# Patient Record
Sex: FEMALE | Race: WHITE | NOT HISPANIC OR LATINO
[De-identification: names, ages, dates, MRNs, and addresses within clinical notes are randomized per-mention and may not be internally consistent; named-entity substitution may affect disease eponyms.]

---

## 2019-04-24 PROBLEM — Z00.00 ENCOUNTER FOR PREVENTIVE HEALTH EXAMINATION: Status: ACTIVE | Noted: 2019-04-24

## 2019-05-23 ENCOUNTER — APPOINTMENT (OUTPATIENT)
Dept: ORTHOPEDIC SURGERY | Facility: CLINIC | Age: 59
End: 2019-05-23

## 2019-05-30 ENCOUNTER — APPOINTMENT (OUTPATIENT)
Dept: ORTHOPEDIC SURGERY | Facility: CLINIC | Age: 59
End: 2019-05-30
Payer: COMMERCIAL

## 2019-05-30 VITALS — HEART RATE: 76 BPM | DIASTOLIC BLOOD PRESSURE: 60 MMHG | SYSTOLIC BLOOD PRESSURE: 92 MMHG

## 2019-05-30 DIAGNOSIS — M54.5 LOW BACK PAIN: ICD-10-CM

## 2019-05-30 DIAGNOSIS — Z96.651 PRESENCE OF RIGHT ARTIFICIAL KNEE JOINT: ICD-10-CM

## 2019-05-30 PROCEDURE — 99204 OFFICE O/P NEW MOD 45 MIN: CPT

## 2019-05-30 PROCEDURE — 72170 X-RAY EXAM OF PELVIS: CPT

## 2019-05-30 PROCEDURE — 73562 X-RAY EXAM OF KNEE 3: CPT | Mod: 50

## 2019-05-30 PROCEDURE — 72100 X-RAY EXAM L-S SPINE 2/3 VWS: CPT

## 2019-07-02 ENCOUNTER — OUTPATIENT (OUTPATIENT)
Dept: OUTPATIENT SERVICES | Facility: HOSPITAL | Age: 59
LOS: 1 days | End: 2019-07-02
Payer: COMMERCIAL

## 2019-07-02 VITALS
WEIGHT: 214.95 LBS | OXYGEN SATURATION: 97 % | HEART RATE: 60 BPM | DIASTOLIC BLOOD PRESSURE: 80 MMHG | TEMPERATURE: 98 F | RESPIRATION RATE: 16 BRPM | SYSTOLIC BLOOD PRESSURE: 120 MMHG | HEIGHT: 66 IN

## 2019-07-02 DIAGNOSIS — Z90.710 ACQUIRED ABSENCE OF BOTH CERVIX AND UTERUS: Chronic | ICD-10-CM

## 2019-07-02 DIAGNOSIS — M17.12 UNILATERAL PRIMARY OSTEOARTHRITIS, LEFT KNEE: ICD-10-CM

## 2019-07-02 DIAGNOSIS — Z98.890 OTHER SPECIFIED POSTPROCEDURAL STATES: Chronic | ICD-10-CM

## 2019-07-02 DIAGNOSIS — Z96.651 PRESENCE OF RIGHT ARTIFICIAL KNEE JOINT: Chronic | ICD-10-CM

## 2019-07-02 DIAGNOSIS — E11.9 TYPE 2 DIABETES MELLITUS WITHOUT COMPLICATIONS: ICD-10-CM

## 2019-07-02 LAB
ANION GAP SERPL CALC-SCNC: 16 MMO/L — HIGH (ref 7–14)
APPEARANCE UR: CLEAR — SIGNIFICANT CHANGE UP
BILIRUB UR-MCNC: NEGATIVE — SIGNIFICANT CHANGE UP
BLD GP AB SCN SERPL QL: NEGATIVE — SIGNIFICANT CHANGE UP
BLOOD UR QL VISUAL: NEGATIVE — SIGNIFICANT CHANGE UP
BUN SERPL-MCNC: 10 MG/DL — SIGNIFICANT CHANGE UP (ref 7–23)
CALCIUM SERPL-MCNC: 10.1 MG/DL — SIGNIFICANT CHANGE UP (ref 8.4–10.5)
CHLORIDE SERPL-SCNC: 102 MMOL/L — SIGNIFICANT CHANGE UP (ref 98–107)
CO2 SERPL-SCNC: 24 MMOL/L — SIGNIFICANT CHANGE UP (ref 22–31)
COLOR SPEC: SIGNIFICANT CHANGE UP
CREAT SERPL-MCNC: 0.64 MG/DL — SIGNIFICANT CHANGE UP (ref 0.5–1.3)
GLUCOSE SERPL-MCNC: 85 MG/DL — SIGNIFICANT CHANGE UP (ref 70–99)
GLUCOSE UR-MCNC: NEGATIVE — SIGNIFICANT CHANGE UP
HBA1C BLD-MCNC: 7.1 % — HIGH (ref 4–5.6)
HCT VFR BLD CALC: 41.3 % — SIGNIFICANT CHANGE UP (ref 34.5–45)
HGB BLD-MCNC: 13 G/DL — SIGNIFICANT CHANGE UP (ref 11.5–15.5)
KETONES UR-MCNC: NEGATIVE — SIGNIFICANT CHANGE UP
LEUKOCYTE ESTERASE UR-ACNC: NEGATIVE — SIGNIFICANT CHANGE UP
MCHC RBC-ENTMCNC: 28.3 PG — SIGNIFICANT CHANGE UP (ref 27–34)
MCHC RBC-ENTMCNC: 31.5 % — LOW (ref 32–36)
MCV RBC AUTO: 90 FL — SIGNIFICANT CHANGE UP (ref 80–100)
NITRITE UR-MCNC: NEGATIVE — SIGNIFICANT CHANGE UP
NRBC # FLD: 0 K/UL — SIGNIFICANT CHANGE UP (ref 0–0)
PH UR: 7 — SIGNIFICANT CHANGE UP (ref 5–8)
PLATELET # BLD AUTO: 293 K/UL — SIGNIFICANT CHANGE UP (ref 150–400)
PMV BLD: 10.8 FL — SIGNIFICANT CHANGE UP (ref 7–13)
POTASSIUM SERPL-MCNC: 4.1 MMOL/L — SIGNIFICANT CHANGE UP (ref 3.5–5.3)
POTASSIUM SERPL-SCNC: 4.1 MMOL/L — SIGNIFICANT CHANGE UP (ref 3.5–5.3)
PROT UR-MCNC: NEGATIVE — SIGNIFICANT CHANGE UP
RBC # BLD: 4.59 M/UL — SIGNIFICANT CHANGE UP (ref 3.8–5.2)
RBC # FLD: 13.8 % — SIGNIFICANT CHANGE UP (ref 10.3–14.5)
RH IG SCN BLD-IMP: POSITIVE — SIGNIFICANT CHANGE UP
SODIUM SERPL-SCNC: 142 MMOL/L — SIGNIFICANT CHANGE UP (ref 135–145)
SP GR SPEC: 1.02 — SIGNIFICANT CHANGE UP (ref 1–1.04)
UROBILINOGEN FLD QL: NORMAL — SIGNIFICANT CHANGE UP
WBC # BLD: 4.79 K/UL — SIGNIFICANT CHANGE UP (ref 3.8–10.5)
WBC # FLD AUTO: 4.79 K/UL — SIGNIFICANT CHANGE UP (ref 3.8–10.5)

## 2019-07-02 PROCEDURE — 93010 ELECTROCARDIOGRAM REPORT: CPT

## 2019-07-02 NOTE — H&P PST ADULT - NSANTHOSAYNRD_GEN_A_CORE
No. TOM screening performed.  STOP BANG Legend: 0-2 = LOW Risk; 3-4 = INTERMEDIATE Risk; 5-8 = HIGH Risk

## 2019-07-02 NOTE — H&P PST ADULT - RS GEN PE MLT RESP DETAILS PC
no wheezes/respirations non-labored/airway patent/good air movement/breath sounds equal/no rales/no rhonchi

## 2019-07-02 NOTE — H&P PST ADULT - NEGATIVE ENMT SYMPTOMS
no sinus symptoms/no nose bleeds/no dysphagia/no hearing difficulty/no ear pain/no throat pain/no tinnitus/no vertigo

## 2019-07-02 NOTE — H&P PST ADULT - NSICDXPASTSURGICALHX_GEN_ALL_CORE_FT
PAST SURGICAL HISTORY:  H/O abdominal hysterectomy     S/P arthroscopy of knee 2016, Left, torn meniscus    S/P total knee replacement, right 2017

## 2019-07-02 NOTE — H&P PST ADULT - NEGATIVE CARDIOVASCULAR SYMPTOMS
no palpitations/no dyspnea on exertion/no paroxysmal nocturnal dyspnea/no chest pain/no peripheral edema

## 2019-07-02 NOTE — H&P PST ADULT - NSICDXPASTMEDICALHX_GEN_ALL_CORE_FT
PAST MEDICAL HISTORY:  Diabetes     Hypercholesterolemia     Torn meniscus left knee s/p surgery    Unilateral primary osteoarthritis, left knee

## 2019-07-02 NOTE — H&P PST ADULT - NSICDXFAMILYHX_GEN_ALL_CORE_FT
FAMILY HISTORY:  Father  Still living? No  Family history of colon cancer, Age at diagnosis: Age Unknown    Mother  Still living? Yes, Estimated age: Age Unknown  Asthma, Age at diagnosis: Age Unknown  Family history of thyroid problem, Age at diagnosis: Age Unknown

## 2019-07-02 NOTE — H&P PST ADULT - HISTORY OF PRESENT ILLNESS
58 year old female presents to presurgical testing with diagnosis of unilateral primary osteoarthritis, left knee scheduled for left total knee replacement for 7/17/19. Pt underwent total right knee replacement in 2017 doing well. Pt is complaining of increasing pain and dysfunction of left knee, underwent left knee arthroscopy in 2016, and symptoms are getting worse.

## 2019-07-02 NOTE — H&P PST ADULT - MUSCULOSKELETAL
details… detailed exam no joint swelling/no calf tenderness/decreased ROM due to pain/normal strength/no joint warmth/no joint erythema/left knee

## 2019-07-02 NOTE — H&P PST ADULT - NSICDXPROBLEM_GEN_ALL_CORE_FT
PROBLEM DIAGNOSES  Problem: Unilateral primary osteoarthritis, left knee  Assessment and Plan: Pt is scheduled for left total knee replacement for 7/17/19. Pre-op instructions provided. Pt given verbal and written instructions with teach back on chlorhexidine shampoo and pepcid. Pt verbalized understanding with return demonstration. Pt stated understanding.    Medical evaluation pending due to Hx of HLD and DM type 2. Pending copy of last echo and stress test.    Problem: Diabetes mellitus  Assessment and Plan: PMH Diabetes,  OR booking notified. Instructed to take last Jentadueto dose on 7/16 AM. PROBLEM DIAGNOSES  Problem: Unilateral primary osteoarthritis, left knee  Assessment and Plan: Pt is scheduled for left total knee replacement for 7/17/19. Pre-op instructions provided. Pt given verbal and written instructions with teach back on chlorhexidine shampoo and pepcid. Pt verbalized understanding with return demonstration. Pt stated understanding.    Medical evaluation pending due to Hx of HLD and DM type 2. Pending copy of last echo and stress test.    Problem: Diabetes mellitus  Assessment and Plan: PMH Diabetes,  OR booking notified. Instructed to take last Jentadueto dose on 7/16 AM.  Last dose of ASA on 7/9/19.    OR booking notified of allergy to contrast and Right knee artificial joint.

## 2019-07-02 NOTE — H&P PST ADULT - NEGATIVE OPHTHALMOLOGIC SYMPTOMS
no blurred vision L/no photophobia/no blurred vision R/no pain L/no pain R/no loss of vision L/no loss of vision R/no diplopia

## 2019-07-03 LAB — SPECIMEN SOURCE: SIGNIFICANT CHANGE UP

## 2019-07-04 LAB
BACTERIA UR CULT: SIGNIFICANT CHANGE UP
SPECIMEN SOURCE: SIGNIFICANT CHANGE UP

## 2019-07-05 LAB — BACTERIA NPH CULT: SIGNIFICANT CHANGE UP

## 2019-07-10 ENCOUNTER — OTHER (OUTPATIENT)
Age: 59
End: 2019-07-10

## 2019-07-10 RX ORDER — MUPIROCIN 20 MG/G
2 OINTMENT TOPICAL
Qty: 1 | Refills: 0 | Status: ACTIVE | COMMUNITY
Start: 2019-07-10 | End: 1900-01-01

## 2019-07-11 ENCOUNTER — OTHER (OUTPATIENT)
Age: 59
End: 2019-07-11

## 2019-07-11 DIAGNOSIS — M17.12 UNILATERAL PRIMARY OSTEOARTHRITIS, LEFT KNEE: ICD-10-CM

## 2019-07-15 PROBLEM — M17.12 UNILATERAL PRIMARY OSTEOARTHRITIS, LEFT KNEE: Chronic | Status: ACTIVE | Noted: 2019-07-02

## 2019-07-16 ENCOUNTER — TRANSCRIPTION ENCOUNTER (OUTPATIENT)
Age: 59
End: 2019-07-16

## 2019-07-16 RX ORDER — SODIUM CHLORIDE 9 MG/ML
1000 INJECTION, SOLUTION INTRAVENOUS
Refills: 0 | Status: DISCONTINUED | OUTPATIENT
Start: 2019-07-17 | End: 2019-07-18

## 2019-07-16 RX ORDER — SODIUM CHLORIDE 9 MG/ML
3 INJECTION INTRAMUSCULAR; INTRAVENOUS; SUBCUTANEOUS EVERY 8 HOURS
Refills: 0 | Status: DISCONTINUED | OUTPATIENT
Start: 2019-07-17 | End: 2019-08-01

## 2019-07-16 NOTE — ASU PATIENT PROFILE, ADULT - PSH
H/O abdominal hysterectomy    S/P arthroscopy of knee  2016, Left, torn meniscus  S/P total knee replacement, right  2017

## 2019-07-16 NOTE — ASU PATIENT PROFILE, ADULT - PMH
Diabetes    Hypercholesterolemia    Torn meniscus  left knee s/p surgery  Unilateral primary osteoarthritis, left knee

## 2019-07-17 ENCOUNTER — FORM ENCOUNTER (OUTPATIENT)
Age: 59
End: 2019-07-17

## 2019-07-17 ENCOUNTER — RESULT REVIEW (OUTPATIENT)
Age: 59
End: 2019-07-17

## 2019-07-17 ENCOUNTER — APPOINTMENT (OUTPATIENT)
Dept: ORTHOPEDIC SURGERY | Facility: HOSPITAL | Age: 59
End: 2019-07-17

## 2019-07-17 ENCOUNTER — OUTPATIENT (OUTPATIENT)
Dept: OUTPATIENT SERVICES | Facility: HOSPITAL | Age: 59
LOS: 1 days | Discharge: HOME | End: 2019-07-17
Payer: COMMERCIAL

## 2019-07-17 VITALS
HEIGHT: 66 IN | DIASTOLIC BLOOD PRESSURE: 73 MMHG | RESPIRATION RATE: 18 BRPM | SYSTOLIC BLOOD PRESSURE: 108 MMHG | TEMPERATURE: 98 F | OXYGEN SATURATION: 97 % | HEART RATE: 57 BPM | WEIGHT: 214.95 LBS

## 2019-07-17 DIAGNOSIS — Z96.651 PRESENCE OF RIGHT ARTIFICIAL KNEE JOINT: Chronic | ICD-10-CM

## 2019-07-17 DIAGNOSIS — Z98.890 OTHER SPECIFIED POSTPROCEDURAL STATES: Chronic | ICD-10-CM

## 2019-07-17 DIAGNOSIS — Z90.710 ACQUIRED ABSENCE OF BOTH CERVIX AND UTERUS: Chronic | ICD-10-CM

## 2019-07-17 DIAGNOSIS — M17.12 UNILATERAL PRIMARY OSTEOARTHRITIS, LEFT KNEE: ICD-10-CM

## 2019-07-17 LAB
ANION GAP SERPL CALC-SCNC: 14 MMO/L — SIGNIFICANT CHANGE UP (ref 7–14)
BUN SERPL-MCNC: 11 MG/DL — SIGNIFICANT CHANGE UP (ref 7–23)
CALCIUM SERPL-MCNC: 9.3 MG/DL — SIGNIFICANT CHANGE UP (ref 8.4–10.5)
CHLORIDE SERPL-SCNC: 105 MMOL/L — SIGNIFICANT CHANGE UP (ref 98–107)
CO2 SERPL-SCNC: 24 MMOL/L — SIGNIFICANT CHANGE UP (ref 22–31)
CREAT SERPL-MCNC: 0.73 MG/DL — SIGNIFICANT CHANGE UP (ref 0.5–1.3)
GLUCOSE BLDC GLUCOMTR-MCNC: 111 MG/DL — HIGH (ref 70–99)
GLUCOSE BLDC GLUCOMTR-MCNC: 179 MG/DL — HIGH (ref 70–99)
GLUCOSE SERPL-MCNC: 207 MG/DL — HIGH (ref 70–99)
HCT VFR BLD CALC: 38.1 % — SIGNIFICANT CHANGE UP (ref 34.5–45)
HGB BLD-MCNC: 11.9 G/DL — SIGNIFICANT CHANGE UP (ref 11.5–15.5)
MCHC RBC-ENTMCNC: 27.7 PG — SIGNIFICANT CHANGE UP (ref 27–34)
MCHC RBC-ENTMCNC: 31.2 % — LOW (ref 32–36)
MCV RBC AUTO: 88.8 FL — SIGNIFICANT CHANGE UP (ref 80–100)
NRBC # FLD: 0 K/UL — SIGNIFICANT CHANGE UP (ref 0–0)
PLATELET # BLD AUTO: 217 K/UL — SIGNIFICANT CHANGE UP (ref 150–400)
PMV BLD: 10 FL — SIGNIFICANT CHANGE UP (ref 7–13)
POTASSIUM SERPL-MCNC: 4.2 MMOL/L — SIGNIFICANT CHANGE UP (ref 3.5–5.3)
POTASSIUM SERPL-SCNC: 4.2 MMOL/L — SIGNIFICANT CHANGE UP (ref 3.5–5.3)
RBC # BLD: 4.29 M/UL — SIGNIFICANT CHANGE UP (ref 3.8–5.2)
RBC # FLD: 13.7 % — SIGNIFICANT CHANGE UP (ref 10.3–14.5)
RH IG SCN BLD-IMP: POSITIVE — SIGNIFICANT CHANGE UP
SODIUM SERPL-SCNC: 143 MMOL/L — SIGNIFICANT CHANGE UP (ref 135–145)
WBC # BLD: 5.61 K/UL — SIGNIFICANT CHANGE UP (ref 3.8–10.5)
WBC # FLD AUTO: 5.61 K/UL — SIGNIFICANT CHANGE UP (ref 3.8–10.5)

## 2019-07-17 PROCEDURE — 27447 TOTAL KNEE ARTHROPLASTY: CPT | Mod: LT

## 2019-07-17 PROCEDURE — 88305 TISSUE EXAM BY PATHOLOGIST: CPT | Mod: 26

## 2019-07-17 PROCEDURE — 88311 DECALCIFY TISSUE: CPT | Mod: 26

## 2019-07-17 RX ORDER — HYDROMORPHONE HYDROCHLORIDE 2 MG/ML
1 INJECTION INTRAMUSCULAR; INTRAVENOUS; SUBCUTANEOUS
Refills: 0 | Status: DISCONTINUED | OUTPATIENT
Start: 2019-07-17 | End: 2019-07-18

## 2019-07-17 RX ORDER — INSULIN LISPRO 100/ML
VIAL (ML) SUBCUTANEOUS
Refills: 0 | Status: DISCONTINUED | OUTPATIENT
Start: 2019-07-17 | End: 2019-08-01

## 2019-07-17 RX ORDER — DOCUSATE SODIUM 100 MG
100 CAPSULE ORAL THREE TIMES A DAY
Refills: 0 | Status: DISCONTINUED | OUTPATIENT
Start: 2019-07-17 | End: 2019-08-01

## 2019-07-17 RX ORDER — OXYCODONE HYDROCHLORIDE 5 MG/1
10 TABLET ORAL EVERY 4 HOURS
Refills: 0 | Status: DISCONTINUED | OUTPATIENT
Start: 2019-07-17 | End: 2019-07-17

## 2019-07-17 RX ORDER — SODIUM CHLORIDE 9 MG/ML
1000 INJECTION, SOLUTION INTRAVENOUS
Refills: 0 | Status: DISCONTINUED | OUTPATIENT
Start: 2019-07-17 | End: 2019-07-18

## 2019-07-17 RX ORDER — ATORVASTATIN CALCIUM 80 MG/1
10 TABLET, FILM COATED ORAL AT BEDTIME
Refills: 0 | Status: DISCONTINUED | OUTPATIENT
Start: 2019-07-17 | End: 2019-08-01

## 2019-07-17 RX ORDER — POLYETHYLENE GLYCOL 3350 17 G/17G
17 POWDER, FOR SOLUTION ORAL DAILY
Refills: 0 | Status: DISCONTINUED | OUTPATIENT
Start: 2019-07-17 | End: 2019-08-01

## 2019-07-17 RX ORDER — ATORVASTATIN CALCIUM 80 MG/1
1 TABLET, FILM COATED ORAL
Qty: 0 | Refills: 0 | DISCHARGE

## 2019-07-17 RX ORDER — PANTOPRAZOLE SODIUM 20 MG/1
40 TABLET, DELAYED RELEASE ORAL
Refills: 0 | Status: DISCONTINUED | OUTPATIENT
Start: 2019-07-17 | End: 2019-08-01

## 2019-07-17 RX ORDER — HYDROMORPHONE HYDROCHLORIDE 2 MG/ML
0.5 INJECTION INTRAMUSCULAR; INTRAVENOUS; SUBCUTANEOUS
Refills: 0 | Status: DISCONTINUED | OUTPATIENT
Start: 2019-07-17 | End: 2019-07-17

## 2019-07-17 RX ORDER — DEXTROSE 50 % IN WATER 50 %
12.5 SYRINGE (ML) INTRAVENOUS ONCE
Refills: 0 | Status: DISCONTINUED | OUTPATIENT
Start: 2019-07-17 | End: 2019-08-01

## 2019-07-17 RX ORDER — SODIUM CHLORIDE 9 MG/ML
1000 INJECTION INTRAMUSCULAR; INTRAVENOUS; SUBCUTANEOUS ONCE
Refills: 0 | Status: COMPLETED | OUTPATIENT
Start: 2019-07-17 | End: 2019-07-17

## 2019-07-17 RX ORDER — HYDROMORPHONE HYDROCHLORIDE 2 MG/ML
0.5 INJECTION INTRAMUSCULAR; INTRAVENOUS; SUBCUTANEOUS EVERY 4 HOURS
Refills: 0 | Status: DISCONTINUED | OUTPATIENT
Start: 2019-07-17 | End: 2019-07-17

## 2019-07-17 RX ORDER — LINAGLIPTIN AND METFORMIN HYDROCHLORIDE 2.5; 85 MG/1; MG/1
1 TABLET, FILM COATED ORAL
Qty: 0 | Refills: 0 | DISCHARGE

## 2019-07-17 RX ORDER — KETOROLAC TROMETHAMINE 30 MG/ML
15 SYRINGE (ML) INJECTION EVERY 6 HOURS
Refills: 0 | Status: DISCONTINUED | OUTPATIENT
Start: 2019-07-18 | End: 2019-07-18

## 2019-07-17 RX ORDER — SENNA PLUS 8.6 MG/1
2 TABLET ORAL EVERY 12 HOURS
Refills: 0 | Status: DISCONTINUED | OUTPATIENT
Start: 2019-07-17 | End: 2019-07-18

## 2019-07-17 RX ORDER — CELECOXIB 200 MG/1
200 CAPSULE ORAL ONCE
Refills: 0 | Status: DISCONTINUED | OUTPATIENT
Start: 2019-07-19 | End: 2019-08-01

## 2019-07-17 RX ORDER — CEFAZOLIN SODIUM 1 G
2000 VIAL (EA) INJECTION EVERY 8 HOURS
Refills: 0 | Status: COMPLETED | OUTPATIENT
Start: 2019-07-18 | End: 2019-07-18

## 2019-07-17 RX ORDER — HYDROMORPHONE HYDROCHLORIDE 2 MG/ML
0.5 INJECTION INTRAMUSCULAR; INTRAVENOUS; SUBCUTANEOUS
Refills: 0 | Status: DISCONTINUED | OUTPATIENT
Start: 2019-07-17 | End: 2019-07-18

## 2019-07-17 RX ORDER — HYDROMORPHONE HYDROCHLORIDE 2 MG/ML
1 INJECTION INTRAMUSCULAR; INTRAVENOUS; SUBCUTANEOUS
Refills: 0 | Status: DISCONTINUED | OUTPATIENT
Start: 2019-07-17 | End: 2019-07-17

## 2019-07-17 RX ORDER — GABAPENTIN 400 MG/1
100 CAPSULE ORAL EVERY 8 HOURS
Refills: 0 | Status: DISCONTINUED | OUTPATIENT
Start: 2019-07-17 | End: 2019-08-01

## 2019-07-17 RX ORDER — ACETAMINOPHEN 500 MG
975 TABLET ORAL ONCE
Refills: 0 | Status: COMPLETED | OUTPATIENT
Start: 2019-07-17 | End: 2019-07-17

## 2019-07-17 RX ORDER — SODIUM CHLORIDE 9 MG/ML
1000 INJECTION, SOLUTION INTRAVENOUS
Refills: 0 | Status: DISCONTINUED | OUTPATIENT
Start: 2019-07-17 | End: 2019-08-01

## 2019-07-17 RX ORDER — ASPIRIN/CALCIUM CARB/MAGNESIUM 324 MG
325 TABLET ORAL
Refills: 0 | Status: DISCONTINUED | OUTPATIENT
Start: 2019-07-17 | End: 2019-08-01

## 2019-07-17 RX ORDER — TRAMADOL HYDROCHLORIDE 50 MG/1
50 TABLET ORAL ONCE
Refills: 0 | Status: DISCONTINUED | OUTPATIENT
Start: 2019-07-17 | End: 2019-07-17

## 2019-07-17 RX ORDER — ONDANSETRON 8 MG/1
4 TABLET, FILM COATED ORAL EVERY 6 HOURS
Refills: 0 | Status: DISCONTINUED | OUTPATIENT
Start: 2019-07-17 | End: 2019-08-01

## 2019-07-17 RX ORDER — DEXTROSE 50 % IN WATER 50 %
25 SYRINGE (ML) INTRAVENOUS ONCE
Refills: 0 | Status: DISCONTINUED | OUTPATIENT
Start: 2019-07-17 | End: 2019-08-01

## 2019-07-17 RX ORDER — OXYCODONE HYDROCHLORIDE 5 MG/1
5 TABLET ORAL EVERY 4 HOURS
Refills: 0 | Status: DISCONTINUED | OUTPATIENT
Start: 2019-07-17 | End: 2019-07-17

## 2019-07-17 RX ORDER — TRAMADOL HYDROCHLORIDE 50 MG/1
50 TABLET ORAL EVERY 8 HOURS
Refills: 0 | Status: DISCONTINUED | OUTPATIENT
Start: 2019-07-17 | End: 2019-07-18

## 2019-07-17 RX ORDER — DEXTROSE 50 % IN WATER 50 %
15 SYRINGE (ML) INTRAVENOUS ONCE
Refills: 0 | Status: DISCONTINUED | OUTPATIENT
Start: 2019-07-17 | End: 2019-08-01

## 2019-07-17 RX ORDER — GLUCAGON INJECTION, SOLUTION 0.5 MG/.1ML
1 INJECTION, SOLUTION SUBCUTANEOUS ONCE
Refills: 0 | Status: DISCONTINUED | OUTPATIENT
Start: 2019-07-17 | End: 2019-08-01

## 2019-07-17 RX ORDER — INSULIN LISPRO 100/ML
VIAL (ML) SUBCUTANEOUS AT BEDTIME
Refills: 0 | Status: DISCONTINUED | OUTPATIENT
Start: 2019-07-18 | End: 2019-08-01

## 2019-07-17 RX ORDER — PANTOPRAZOLE SODIUM 20 MG/1
40 TABLET, DELAYED RELEASE ORAL ONCE
Refills: 0 | Status: COMPLETED | OUTPATIENT
Start: 2019-07-17 | End: 2019-07-17

## 2019-07-17 RX ORDER — ACETAMINOPHEN 500 MG
650 TABLET ORAL EVERY 6 HOURS
Refills: 0 | Status: DISCONTINUED | OUTPATIENT
Start: 2019-07-17 | End: 2019-07-18

## 2019-07-17 RX ORDER — ACETAMINOPHEN 500 MG
650 TABLET ORAL EVERY 6 HOURS
Refills: 0 | Status: DISCONTINUED | OUTPATIENT
Start: 2019-07-17 | End: 2019-08-01

## 2019-07-17 RX ORDER — ATORVASTATIN CALCIUM 80 MG/1
10 TABLET, FILM COATED ORAL DAILY
Refills: 0 | Status: DISCONTINUED | OUTPATIENT
Start: 2019-07-17 | End: 2019-07-17

## 2019-07-17 RX ADMIN — Medication 975 MILLIGRAM(S): at 12:33

## 2019-07-17 RX ADMIN — TRAMADOL HYDROCHLORIDE 50 MILLIGRAM(S): 50 TABLET ORAL at 12:34

## 2019-07-17 RX ADMIN — SODIUM CHLORIDE 30 MILLILITER(S): 9 INJECTION, SOLUTION INTRAVENOUS at 12:33

## 2019-07-17 RX ADMIN — TRAMADOL HYDROCHLORIDE 50 MILLIGRAM(S): 50 TABLET ORAL at 13:05

## 2019-07-17 RX ADMIN — TRAMADOL HYDROCHLORIDE 50 MILLIGRAM(S): 50 TABLET ORAL at 22:58

## 2019-07-17 RX ADMIN — SODIUM CHLORIDE 1000 MILLILITER(S): 9 INJECTION INTRAMUSCULAR; INTRAVENOUS; SUBCUTANEOUS at 22:36

## 2019-07-17 RX ADMIN — SODIUM CHLORIDE 3 MILLILITER(S): 9 INJECTION INTRAMUSCULAR; INTRAVENOUS; SUBCUTANEOUS at 22:57

## 2019-07-17 RX ADMIN — HYDROMORPHONE HYDROCHLORIDE 0.5 MILLIGRAM(S): 2 INJECTION INTRAMUSCULAR; INTRAVENOUS; SUBCUTANEOUS at 20:10

## 2019-07-17 RX ADMIN — SODIUM CHLORIDE 150 MILLILITER(S): 9 INJECTION, SOLUTION INTRAVENOUS at 19:31

## 2019-07-17 RX ADMIN — Medication 975 MILLIGRAM(S): at 12:37

## 2019-07-17 RX ADMIN — Medication 100 MILLIGRAM(S): at 22:58

## 2019-07-17 RX ADMIN — PANTOPRAZOLE SODIUM 40 MILLIGRAM(S): 20 TABLET, DELAYED RELEASE ORAL at 12:33

## 2019-07-17 NOTE — PROGRESS NOTE ADULT - SUBJECTIVE AND OBJECTIVE BOX
Ortho Post-Op    Patient was seen and examined at bedside. Denies CP/SOB/Dizziness/headaches/N/V. Pain is controlled.     Vital Signs Last 24 Hrs  T(C): 36.5 (17 Jul 2019 19:30), Max: 36.8 (17 Jul 2019 11:36)  T(F): 97.7 (17 Jul 2019 19:30), Max: 98.2 (17 Jul 2019 11:36)  HR: 65 (17 Jul 2019 21:15) (57 - 91)  BP: 110/67 (17 Jul 2019 21:15) (108/73 - 125/79)  BP(mean): 77 (17 Jul 2019 21:15) (74 - 93)  RR: 18 (17 Jul 2019 21:15) (12 - 20)  SpO2: 100% (17 Jul 2019 21:15) (91% - 100%)    GEN: NAD  LLE: Dressing C/D/I.  HV in place   Bilat LE : Motor intact + EHL/FHL/TA/GS. Sensation is grossly intact distal . Extremity warm. Compartments are soft. DP 1+    Labs:                          11.9   5.61  )-----------( 217      ( 17 Jul 2019 19:30 )             38.1       07-17    143  |  105  |  11  ----------------------------<  207<H>  4.2   |  24  |  x     Ca    9.3      17 Jul 2019 19:30        A/P: Patient is a 58y y/o Female s/p elective left total knee arthroplasty, POD # 0    -Pain control/analgesia  -Inc spirometry  -Foot pumps  -F/U AM Labs  -PT/OT/WBAT  -Antibiotic periop-ancef  -Anticoagulation-aspirin 325mg bid  -Continue to monitor. Notify ortho with any questions.

## 2019-07-17 NOTE — ASU PREOP CHECKLIST - 4.
OK that pt took aspirin on 7/16 81mg as per anesthesiologist MD Alves pt took aspirin 81mg on 7/16 , ok as per anesthesiologist MD Alves

## 2019-07-17 NOTE — ASU PREOP CHECKLIST - SELECT TESTS ORDERED
Urinalysis/CMP/nasal culture, urine culture/BMP POCT Blood Glucose/Type and Screen/Urinalysis/BMP/CMP/Type and Cross/nasal culture, urine culture

## 2019-07-18 ENCOUNTER — TRANSCRIPTION ENCOUNTER (OUTPATIENT)
Age: 59
End: 2019-07-18

## 2019-07-18 VITALS
OXYGEN SATURATION: 96 % | HEART RATE: 66 BPM | RESPIRATION RATE: 16 BRPM | DIASTOLIC BLOOD PRESSURE: 67 MMHG | SYSTOLIC BLOOD PRESSURE: 96 MMHG | TEMPERATURE: 98 F

## 2019-07-18 DIAGNOSIS — D62 ACUTE POSTHEMORRHAGIC ANEMIA: ICD-10-CM

## 2019-07-18 DIAGNOSIS — M17.12 UNILATERAL PRIMARY OSTEOARTHRITIS, LEFT KNEE: ICD-10-CM

## 2019-07-18 DIAGNOSIS — Z29.9 ENCOUNTER FOR PROPHYLACTIC MEASURES, UNSPECIFIED: ICD-10-CM

## 2019-07-18 DIAGNOSIS — E78.00 PURE HYPERCHOLESTEROLEMIA, UNSPECIFIED: ICD-10-CM

## 2019-07-18 DIAGNOSIS — E11.9 TYPE 2 DIABETES MELLITUS WITHOUT COMPLICATIONS: ICD-10-CM

## 2019-07-18 LAB
ANION GAP SERPL CALC-SCNC: 13 MMO/L — SIGNIFICANT CHANGE UP (ref 7–14)
BUN SERPL-MCNC: 10 MG/DL — SIGNIFICANT CHANGE UP (ref 7–23)
CALCIUM SERPL-MCNC: 9 MG/DL — SIGNIFICANT CHANGE UP (ref 8.4–10.5)
CHLORIDE SERPL-SCNC: 106 MMOL/L — SIGNIFICANT CHANGE UP (ref 98–107)
CO2 SERPL-SCNC: 20 MMOL/L — LOW (ref 22–31)
CREAT SERPL-MCNC: 0.58 MG/DL — SIGNIFICANT CHANGE UP (ref 0.5–1.3)
GLUCOSE BLDC GLUCOMTR-MCNC: 206 MG/DL — HIGH (ref 70–99)
GLUCOSE BLDC GLUCOMTR-MCNC: 293 MG/DL — HIGH (ref 70–99)
GLUCOSE SERPL-MCNC: 187 MG/DL — HIGH (ref 70–99)
HCT VFR BLD CALC: 33 % — LOW (ref 34.5–45)
HGB BLD-MCNC: 10.5 G/DL — LOW (ref 11.5–15.5)
MCHC RBC-ENTMCNC: 28.7 PG — SIGNIFICANT CHANGE UP (ref 27–34)
MCHC RBC-ENTMCNC: 31.8 % — LOW (ref 32–36)
MCV RBC AUTO: 90.2 FL — SIGNIFICANT CHANGE UP (ref 80–100)
NRBC # FLD: 0 K/UL — SIGNIFICANT CHANGE UP (ref 0–0)
PLATELET # BLD AUTO: 222 K/UL — SIGNIFICANT CHANGE UP (ref 150–400)
PMV BLD: 10.2 FL — SIGNIFICANT CHANGE UP (ref 7–13)
POTASSIUM SERPL-MCNC: 4.2 MMOL/L — SIGNIFICANT CHANGE UP (ref 3.5–5.3)
POTASSIUM SERPL-SCNC: 4.2 MMOL/L — SIGNIFICANT CHANGE UP (ref 3.5–5.3)
RBC # BLD: 3.66 M/UL — LOW (ref 3.8–5.2)
RBC # FLD: 13.5 % — SIGNIFICANT CHANGE UP (ref 10.3–14.5)
SODIUM SERPL-SCNC: 139 MMOL/L — SIGNIFICANT CHANGE UP (ref 135–145)
WBC # BLD: 9.44 K/UL — SIGNIFICANT CHANGE UP (ref 3.8–10.5)
WBC # FLD AUTO: 9.44 K/UL — SIGNIFICANT CHANGE UP (ref 3.8–10.5)

## 2019-07-18 PROCEDURE — 73560 X-RAY EXAM OF KNEE 1 OR 2: CPT | Mod: 26,LT

## 2019-07-18 PROCEDURE — 99238 HOSP IP/OBS DSCHRG MGMT 30/<: CPT

## 2019-07-18 PROCEDURE — 99205 OFFICE O/P NEW HI 60 MIN: CPT

## 2019-07-18 RX ORDER — TRAMADOL HYDROCHLORIDE 50 MG/1
25 TABLET ORAL EVERY 8 HOURS
Refills: 0 | Status: DISCONTINUED | OUTPATIENT
Start: 2019-07-18 | End: 2019-07-18

## 2019-07-18 RX ORDER — PANTOPRAZOLE SODIUM 20 MG/1
1 TABLET, DELAYED RELEASE ORAL
Qty: 45 | Refills: 0
Start: 2019-07-18 | End: 2019-08-31

## 2019-07-18 RX ORDER — GABAPENTIN 400 MG/1
1 CAPSULE ORAL
Qty: 90 | Refills: 0
Start: 2019-07-18 | End: 2019-08-16

## 2019-07-18 RX ORDER — ACETAMINOPHEN 500 MG
3 TABLET ORAL
Qty: 84 | Refills: 3
Start: 2019-07-18 | End: 2019-08-14

## 2019-07-18 RX ORDER — SENNA PLUS 8.6 MG/1
2 TABLET ORAL AT BEDTIME
Refills: 0 | Status: DISCONTINUED | OUTPATIENT
Start: 2019-07-18 | End: 2019-08-01

## 2019-07-18 RX ORDER — KETOROLAC TROMETHAMINE 30 MG/ML
15 SYRINGE (ML) INJECTION EVERY 6 HOURS
Refills: 0 | Status: COMPLETED | OUTPATIENT
Start: 2019-07-18 | End: 2019-07-19

## 2019-07-18 RX ORDER — ASPIRIN/CALCIUM CARB/MAGNESIUM 324 MG
1 TABLET ORAL
Qty: 0 | Refills: 0 | DISCHARGE

## 2019-07-18 RX ORDER — DOCUSATE SODIUM 100 MG
1 CAPSULE ORAL
Qty: 90 | Refills: 0
Start: 2019-07-18 | End: 2019-08-16

## 2019-07-18 RX ORDER — TRAMADOL HYDROCHLORIDE 50 MG/1
50 TABLET ORAL EVERY 8 HOURS
Refills: 0 | Status: DISCONTINUED | OUTPATIENT
Start: 2019-07-18 | End: 2019-07-18

## 2019-07-18 RX ORDER — SENNA PLUS 8.6 MG/1
2 TABLET ORAL
Qty: 60 | Refills: 0
Start: 2019-07-18 | End: 2019-08-16

## 2019-07-18 RX ORDER — LANOLIN ALCOHOL/MO/W.PET/CERES
3 CREAM (GRAM) TOPICAL AT BEDTIME
Refills: 0 | Status: DISCONTINUED | OUTPATIENT
Start: 2019-07-18 | End: 2019-08-01

## 2019-07-18 RX ORDER — TRAMADOL HYDROCHLORIDE 50 MG/1
1 TABLET ORAL
Qty: 21 | Refills: 0
Start: 2019-07-18 | End: 2019-07-24

## 2019-07-18 RX ORDER — DEXAMETHASONE 0.5 MG/5ML
10 ELIXIR ORAL ONCE
Refills: 0 | Status: COMPLETED | OUTPATIENT
Start: 2019-07-18 | End: 2019-07-18

## 2019-07-18 RX ORDER — OXYCODONE HYDROCHLORIDE 5 MG/1
2 TABLET ORAL
Qty: 84 | Refills: 0
Start: 2019-07-18 | End: 2019-07-24

## 2019-07-18 RX ORDER — ASPIRIN/CALCIUM CARB/MAGNESIUM 324 MG
1 TABLET ORAL
Qty: 84 | Refills: 0
Start: 2019-07-18 | End: 2019-08-28

## 2019-07-18 RX ADMIN — POLYETHYLENE GLYCOL 3350 17 GRAM(S): 17 POWDER, FOR SOLUTION ORAL at 12:00

## 2019-07-18 RX ADMIN — ATORVASTATIN CALCIUM 10 MILLIGRAM(S): 80 TABLET, FILM COATED ORAL at 05:11

## 2019-07-18 RX ADMIN — TRAMADOL HYDROCHLORIDE 25 MILLIGRAM(S): 50 TABLET ORAL at 13:57

## 2019-07-18 RX ADMIN — Medication 100 MILLIGRAM(S): at 13:06

## 2019-07-18 RX ADMIN — SODIUM CHLORIDE 150 MILLILITER(S): 9 INJECTION, SOLUTION INTRAVENOUS at 09:40

## 2019-07-18 RX ADMIN — Medication 4: at 07:45

## 2019-07-18 RX ADMIN — Medication 15 MILLIGRAM(S): at 11:59

## 2019-07-18 RX ADMIN — Medication 6: at 12:00

## 2019-07-18 RX ADMIN — SODIUM CHLORIDE 3 MILLILITER(S): 9 INJECTION INTRAMUSCULAR; INTRAVENOUS; SUBCUTANEOUS at 13:04

## 2019-07-18 RX ADMIN — PANTOPRAZOLE SODIUM 40 MILLIGRAM(S): 20 TABLET, DELAYED RELEASE ORAL at 07:45

## 2019-07-18 RX ADMIN — Medication 100 MILLIGRAM(S): at 10:44

## 2019-07-18 RX ADMIN — Medication 650 MILLIGRAM(S): at 12:00

## 2019-07-18 RX ADMIN — Medication 100 MILLIGRAM(S): at 05:11

## 2019-07-18 RX ADMIN — SODIUM CHLORIDE 3 MILLILITER(S): 9 INJECTION INTRAMUSCULAR; INTRAVENOUS; SUBCUTANEOUS at 05:10

## 2019-07-18 RX ADMIN — Medication 102 MILLIGRAM(S): at 09:41

## 2019-07-18 RX ADMIN — Medication 325 MILLIGRAM(S): at 05:11

## 2019-07-18 RX ADMIN — Medication 650 MILLIGRAM(S): at 05:11

## 2019-07-18 RX ADMIN — Medication 15 MILLIGRAM(S): at 05:11

## 2019-07-18 RX ADMIN — Medication 100 MILLIGRAM(S): at 01:29

## 2019-07-18 NOTE — PHYSICAL THERAPY INITIAL EVALUATION ADULT - PLANNED THERAPY INTERVENTIONS, PT EVAL
transfer training/bed mobility training/gait training/stairs training/ROM/balance training/strengthening

## 2019-07-18 NOTE — CONSULT NOTE ADULT - PROBLEM SELECTOR RECOMMENDATION 9
Pain well controlled; continue management and pain control per ortho recs with toradol, celebrex, dilaudid prn and oxycodone IR prn.

## 2019-07-18 NOTE — PROVIDER CONTACT NOTE (OTHER) - RECOMMENDATIONS
Notify PA. Holding tramadol PO as per order. Suggested to PA to possibly change dose to 25mg so pt can still receive pain medication.

## 2019-07-18 NOTE — PROVIDER CONTACT NOTE (OTHER) - ASSESSMENT
VSS except BP 91/56. Repeat 106/62. Pt denies dizziness and lightheadedness now. States it sometimes occurs when getting up. According to family & pt, baseline . No other distress noted.

## 2019-07-18 NOTE — PROGRESS NOTE ADULT - SUBJECTIVE AND OBJECTIVE BOX
S: Pt was examined at bedside and is resting comfortably. No acute events overnight. Denies CP/SOB. HV unsewn is in place with 0cc output.    O:  T(C): 36.8 (07-18-19 @ 05:08), Max: 36.8 (07-17-19 @ 11:36)  HR: 63 (07-18-19 @ 05:08) (57 - 91)  BP: 102/77 (07-18-19 @ 05:08) (102/77 - 125/79)  RR: 15 (07-18-19 @ 05:08) (12 - 20)  SpO2: 96% (07-18-19 @ 05:08) (91% - 100%)  Wt(kg): --                          10.5   9.44  )-----------( 222      ( 18 Jul 2019 05:40 )             33.0     07-17    143  |  105  |  11  ----------------------------<  207<H>  4.2   |  24  |  0.73    Ca    9.3      17 Jul 2019 19:30    PE:  General: AAOx3, Resting comfortably  Extremities:  LLE:  Dressing is c/d/i, in knee immobilizer, HV (unsewn) 0 cc output  Compartments s/c  +EHL/FHL/TA/GS  SILT SPN/DPn/TN/Bustos/Sa  +2 DP, PT

## 2019-07-18 NOTE — DISCHARGE NOTE NURSING/CASE MANAGEMENT/SOCIAL WORK - NSDCPNINST_GEN_ALL_CORE
You have a post op appointment with Dr. Calderon on July 31, 2019 @ 8:45am in the [address] [place] [address] office. If you are unable to keep this appointment, please call the office to reschedule. Call MD if you develop a fever, or if there is redness, swelling, drainage or pain not relieved by pain medication. No heavy lifting, bending, or straining to move your bowels. Take over the counter stool softeners as needed to prevent constipation which may be caused by pain medication. You have a post op appointment with Dr. Calderon on July 31, 2019 @ 8:45am in the [address] [place] [address] office. If you are unable to keep this appointment, please call the office to reschedule. Call MD if you develop a fever, or if there is redness, swelling, drainage or pain not relieved by pain medication. No heavy lifting, bending, or straining to move your bowels. Take over the counter stool softeners as needed to prevent constipation which may be caused by pain medication. Your A1C= 7.1. Continue to follow a consistent carbohydrate diet and take your medications for diabetes. Follow up with your endocrinologist/ primary doctor for diabetes management.

## 2019-07-18 NOTE — CONSULT NOTE ADULT - ASSESSMENT
58F h/o HLP, DM and unilateral primary osteoarthritis now s/p left knee scheduled for left total knee replacement POD#1.

## 2019-07-18 NOTE — CONSULT NOTE ADULT - PROBLEM SELECTOR RECOMMENDATION 2
BoT8k=2.1%. FS mildly well controlled. Continue with ISS for now and continue to monitor FS closely.  -hold home meds while in-house

## 2019-07-18 NOTE — DISCHARGE NOTE PROVIDER - CARE PROVIDER_API CALL
Gucci Calderon)  Orthopaedic Surgery  [address], [address]  [place] [address]  Phone: [phone]  Fax: [phone]  Follow Up Time:

## 2019-07-18 NOTE — PROGRESS NOTE ADULT - ASSESSMENT
A: 58 year old female POD 1 from L TKA.     -WBAT  -PT/OT  -Remove HV after PT  -Pain control  -DVT PPx - ASA BID  -Regular diet  -Dispo: Home today

## 2019-07-18 NOTE — DISCHARGE NOTE NURSING/CASE MANAGEMENT/SOCIAL WORK - NSDCDPATPORTLINK_GEN_ALL_CORE
You can access the [facility] Patient Portal, offered by [facility], by registering with the following website: [email or web address]

## 2019-07-18 NOTE — CONSULT NOTE ADULT - SUBJECTIVE AND OBJECTIVE BOX
CHIEF COMPLAINT: Patient is a 58y old  Female who presents with a chief complaint of elective Left total knee arthroplasty 7/17/2019 (18 Jul 2019 07:32)    HPI: 58F h/o HLP, DM presents to presurgical testing with diagnosis of unilateral primary osteoarthritis, left knee scheduled for left total knee replacement for 7/17/19. Pt underwent total right knee replacement in 2017 doing well. Pt is complaining of increasing pain and dysfunction of left knee, underwent left knee arthroscopy in 2016, and symptoms are getting worse.     Patient tolerated procedure well. Denies any F/C/N/V, CP or SOB.     Pain Symptoms if applicable:             	                         none	   mild         moderate         severe  	                            0	    1-3	     4-6	         7-10  Pain: 4  Location: left knee	  Modifying factors: pain with walking	  Associated symptoms: left leg pain	    Allergies: contrast media (iodine-based) (Anaphylaxis)    HOME MEDICATIONS: [x] Reviewed    MEDICATIONS  (STANDING):  acetaminophen   Tablet .. 650 milliGRAM(s) Oral every 6 hours  aspirin enteric coated 325 milliGRAM(s) Oral two times a day  atorvastatin 10 milliGRAM(s) Oral at bedtime  dextrose 5%. 1000 milliLiter(s) (50 mL/Hr) IV Continuous <Continuous>  dextrose 50% Injectable 12.5 Gram(s) IV Push once  dextrose 50% Injectable 25 Gram(s) IV Push once  dextrose 50% Injectable 25 Gram(s) IV Push once  docusate sodium 100 milliGRAM(s) Oral three times a day  gabapentin 100 milliGRAM(s) Oral every 8 hours  insulin lispro (HumaLOG) corrective regimen sliding scale   SubCutaneous three times a day before meals  insulin lispro (HumaLOG) corrective regimen sliding scale   SubCutaneous at bedtime  ketorolac   Injectable 15 milliGRAM(s) IV Push every 6 hours  pantoprazole    Tablet 40 milliGRAM(s) Oral before breakfast  polyethylene glycol 3350 17 Gram(s) Oral daily  senna 2 Tablet(s) Oral at bedtime  sodium chloride 0.9% lock flush 3 milliLiter(s) IV Push every 8 hours    MEDICATIONS  (PRN):  aluminum hydroxide/magnesium hydroxide/simethicone Suspension 30 milliLiter(s) Oral four times a day PRN Indigestion  dextrose 40% Gel 15 Gram(s) Oral once PRN Blood Glucose LESS THAN 70 milliGRAM(s)/deciliter  glucagon  Injectable 1 milliGRAM(s) IntraMuscular once PRN Glucose LESS THAN 70 milligrams/deciliter  HYDROmorphone  Injectable 0.5 milliGRAM(s) IV Push every 4 hours PRN breakthrough pain  melatonin 3 milliGRAM(s) Oral at bedtime PRN Insomnia  ondansetron Injectable 4 milliGRAM(s) IV Push every 6 hours PRN Nausea and/or Vomiting  oxyCODONE    IR 5 milliGRAM(s) Oral every 4 hours PRN Mild Pain And Moderate Pain  oxyCODONE    IR 10 milliGRAM(s) Oral every 4 hours PRN Severe Pain (7 - 10)    PAST MEDICAL & SURGICAL HISTORY:  Unilateral primary osteoarthritis, left knee  Torn meniscus: left knee s/p surgery  Hypercholesterolemia  Diabetes  S/P arthroscopy of knee: 2016, Left, torn meniscus  S/P total knee replacement, right: 2017  H/O abdominal hysterectomy  [x ] Reviewed     SOCIAL HISTORY:  · Marital Status		  · Occupation	Nurse	  · Lives With	spouse; mother, daughter	    Substance Use History:  · Substance Use	caffeine	  · Caffeine Type	coffee	  · Caffeine Amount/Frequency	1-2 cups/cans per day	    Alcohol Use History:  · Have you ever consumed alcohol	never	    Tobacco Usage:  · Tobacco Usage: Never smoker	    FAMILY HISTORY:  Family history of thyroid problem (Mother): hypothyroidism  Asthma (Mother)  Family history of colon cancer (Father)  [x] No pertinent family history in first degree relatives     REVIEW OF SYSTEMS:  [x] All other ROS negative      Vital Signs Last 24 Hrs  T(C): 37.2 (18 Jul 2019 12:53), Max: 37.2 (18 Jul 2019 12:53)  T(F): 99 (18 Jul 2019 12:53), Max: 99 (18 Jul 2019 12:53)  HR: 70 (18 Jul 2019 13:02) (58 - 91)  BP: 106/62 (18 Jul 2019 13:02) (91/62 - 125/79)  BP(mean): 77 (17 Jul 2019 21:30) (74 - 93)  RR: 15 (18 Jul 2019 12:53) (12 - 20)  SpO2: 95% (18 Jul 2019 12:53) (91% - 100%)    PHYSICAL EXAM:  GENERAL: NAD, well-groomed, well-developed  HEAD:  Atraumatic, Normocephalic  EYES: EOMI, PERRLA, conjunctiva and sclera clear  ENMT: Moist mucous membranes  NECK: Supple, No JVD  RESPIRATORY: Clear to auscultation bilaterally; No rales, rhonchi, wheezing, or rubs  CARDIOVASCULAR: Regular rate and rhythm; No murmurs, rubs, or gallops  GASTROINTESTINAL: Soft, Nontender, Nondistended; Bowel sounds present  GENITOURINARY: Not examined  EXTREMITIES:  2+ Peripheral Pulses, No clubbing, cyanosis, or edema  NERVOUS SYSTEM:  Alert & Oriented X3; Moving all 4 extremities; No gross sensory deficits  HEME/LYMPH: No lymphadenopathy noted  SKIN: No rashes or lesions; Dressing C/D/I    LABS:                        10.5   9.44  )-----------( 222      ( 18 Jul 2019 05:40 )             33.0     Hemoglobin: 10.5 g/dL (07-18 @ 05:40)  Hemoglobin: 11.9 g/dL (07-17 @ 19:30)    07-18    139  |  106  |  10  ----------------------------<  187<H>  4.2   |  20<L>  |  0.58    Ca    9.0      18 Jul 2019 05:40    RADIOLOGY & ADDITIONAL STUDIES:  Imaging:   Personally Reviewed:  [x] YES   < from: Xray Chest 1 View AP/PA (09.25.14 @ 19:39) >  IMPRESSION: 1. Poorly inspired film demonstrates no gross consolidation.    < end of copied text >              [ ] Consultant(s) Notes Reviewed  [x] Care Discussed with Consultants/Other Providers: Ortho PA Mary Ann - discussed disposition

## 2019-07-18 NOTE — DISCHARGE NOTE PROVIDER - NSDCCPTREATMENT_GEN_ALL_CORE_FT
PRINCIPAL PROCEDURE  Procedure: Left total knee replacement  Findings and Treatment: dictated operative note  pain control, pain med may cause drowsiness, refrain from activities require decision making, physical therapy to help resume activities of daily living  weight bearing as tolerated to Left leg  Office appointment is already made (see card attached to discharge folder) so if you need to reschedule please call Dr. Calderon's office [phone]

## 2019-07-18 NOTE — DISCHARGE NOTE PROVIDER - CARE PROVIDERS DIRECT ADDRESSES
[email or web address]

## 2019-07-18 NOTE — DISCHARGE NOTE PROVIDER - NSDCCPCAREPLAN_GEN_ALL_CORE_FT
PRINCIPAL DISCHARGE DIAGNOSIS  Diagnosis: Left knee DJD  Assessment and Plan of Treatment: 59 yo female with a history of OA, admitted to the orthopedic service for an elective left total knee replacement.  Pt medically optimized and underwent surgery on 7/17 by Dr Calderon.  Pt tolerated procedure well without any carlito-op complications.  Post op, pt made weight bear as tolerated and put on  mg twice a day for blood clot prevention for 6 weeks  Pt doing well in PT/OT, pain controlled, labs and vitals stable, incision clean and dry, as per Dr Calderon, pt stable for discharge.  Pt is to keep aquacel dressing on until POD #14, and then remove dressing and sutures.  Call Dr Borrero office to make an appt within 2 weeks at [phone] and PMD within 2 weeks for continuity of care as there may have been changes in patients medications during hospital stay.  Notify ortho with any questions or concerns. PRINCIPAL DISCHARGE DIAGNOSIS  Diagnosis: Left knee DJD  Assessment and Plan of Treatment: 57 yo female with a history of OA, admitted to the orthopedic service for an elective left total knee replacement.  Pt medically optimized and underwent surgery on 7/17 by Dr Calderon.  Pt tolerated procedure well without any carlito-dictated operative note complications.  Post dictated operative note, patient made weight bear as tolerated and put on  mg twice a day for blood clot prevention for 6 weeks  Pt doing well in physical therapy to help resume activities of daily living/OT, pain controlled, labs and vitals stable, incision clean and dry, as per Dr Calderon, patient stable for discharge.  Pt is to keep aquacel dressing on until POD #14, and then remove dressing and sutures.  Call Dr Borrero office to make an appointment within 2 weeks at [phone] and PMD within 2 weeks for continuity of care as there may have been changes in patients medications during hospital stay.  Notify ortho with any questions or concerns.  Istop reviewed Reference #: [id number]

## 2019-07-18 NOTE — DISCHARGE NOTE NURSING/CASE MANAGEMENT/SOCIAL WORK - NSDPDISTO_GEN_ALL_CORE
Home with home care/Pt. is afebrile and offers no complaints. In no acute distress. Left knee dressing: clean, dry and intact. Pt is ambulating with a walker, tolerating diet well, and voiding in adequate amounts.

## 2019-07-18 NOTE — PHYSICAL THERAPY INITIAL EVALUATION ADULT - ACTIVE RANGE OF MOTION EXAMINATION, REHAB EVAL
Right LE Active ROM was WFL (within functional limits)/bilateral upper extremity Active ROM was WFL (within functional limits)/left hip and knee fleixon 0-90, ankle wfl

## 2019-07-18 NOTE — DISCHARGE NOTE PROVIDER - HOSPITAL COURSE
57 yo female with a history of OA, admitted to the orthopedic service for an elective left total knee replacement.  Pt medically optimized and underwent surgery on 7/17 by Dr Calderon.  Pt tolerated procedure well without any carlito-op complications.  Post op, pt made weight bear as tolerated and put on  mg twice a day for blood clot prevention for 6 weeks  Pt doing well in PT/OT, pain controlled, labs and vitals stable, incision clean and dry, as per Dr Calderon, pt stable for discharge.  Pt is to keep aquacel dressing on until POD #14, and then remove dressing and sutures.  Call Dr Borrero office to make an appt within 2 weeks at [phone] and PMD within 2 weeks for continuity of care as there may have been changes in patients medications during hospital stay.  Notify ortho with any questions or concerns. 57 yo female with a history of OA, admitted to the orthopedic service for an elective left total knee replacement.  Pt medically optimized and underwent surgery on 7/17 by Dr Calderon.  Pt tolerated procedure well without any carlito-op complications.  Post op, pt made weight bear as tolerated and put on  mg twice a day for blood clot prevention for 6 weeks  Pt doing well in PT/OT, pain controlled, labs and vitals stable, incision clean and dry, as per Dr Calderon, pt stable for discharge.  Pt is to keep aquacel dressing on until POD #14, and then remove dressing and sutures.  Call Dr Borrero office to make an appt within 2 weeks at [phone] and PMD within 2 weeks for continuity of care as there may have been changes in patients medications during hospital stay.  Notify ortho with any questions or concerns.        Istop reviewed Reference #: [id number]

## 2019-07-25 LAB — SURGICAL PATHOLOGY STUDY: SIGNIFICANT CHANGE UP

## 2019-07-31 ENCOUNTER — APPOINTMENT (OUTPATIENT)
Dept: ORTHOPEDIC SURGERY | Facility: CLINIC | Age: 59
End: 2019-07-31
Payer: COMMERCIAL

## 2019-07-31 VITALS — SYSTOLIC BLOOD PRESSURE: 105 MMHG | HEART RATE: 88 BPM | DIASTOLIC BLOOD PRESSURE: 68 MMHG

## 2019-07-31 PROCEDURE — 99024 POSTOP FOLLOW-UP VISIT: CPT

## 2019-07-31 PROCEDURE — 73562 X-RAY EXAM OF KNEE 3: CPT | Mod: LT

## 2019-07-31 RX ORDER — OXYCODONE 5 MG/1
5 TABLET ORAL
Qty: 42 | Refills: 0 | Status: ACTIVE | COMMUNITY
Start: 2019-07-31 | End: 1900-01-01

## 2019-07-31 RX ORDER — GABAPENTIN 100 MG/1
100 CAPSULE ORAL 3 TIMES DAILY
Qty: 90 | Refills: 0 | Status: ACTIVE | COMMUNITY
Start: 2019-07-31 | End: 1900-01-01

## 2019-07-31 RX ORDER — TRAMADOL HYDROCHLORIDE 50 MG/1
50 TABLET, COATED ORAL
Qty: 90 | Refills: 0 | Status: ACTIVE | COMMUNITY
Start: 2019-07-31 | End: 1900-01-01

## 2019-07-31 NOTE — END OF VISIT
[FreeTextEntry3] : I have read the above documentation and agree with the treatment and plan. \par Gucci Calderon MD\par \par ______________________________________________\par [facility]: Hip/Knee Arthroplasty\par [address], [address], [place] [address]\par (t) [phone]\par (f) [phone]\par

## 2019-07-31 NOTE — PHYSICAL EXAM
[Antalgic] : antalgic [LE] : Sensory: Intact in bilateral lower extremities [Knee] : patellar 2+ and symmetric bilaterally [Ankle] : ankle 2+ and symmetric bilaterally [DP] : dorsalis pedis 2+ and symmetric bilaterally [PT] : posterior tibial 2+ and symmetric bilaterally [de-identified] : On general examination the patient is adequately groomed and nourished. The vital parameters are as recorded. \par There is no lymphedema or diffuse swelling, no varicose veins, no skin warmth/erythema/scars/swelling, no ulcers and no palpable lymph nodes or masses in both lower extremities. Bilateral pedal pulses are well palpable.\par Upper Extremity:\par Both right and left upper extremities are unremarkable in terms of skin rash, lesions, pigmentation, redness, tenderness, swelling, joint instability, abnormal deformity or crepitus. The overall range of motion, sensation, motor tone and strength testing are normal.\par \par Knee Exam\par The gait is left stiff knee antalgic.\par Knee alignment:            Right neutral with no flexion deformity. \par Left 3 degrees varus with mild flexion deformity.\par Right knee demonstrates well healed scar from TKR and the skin has no warmth, erythema, swelling or tenderness. \par Left knee demonstrates well healed scars from arthroscopy with no warmth erythema swelling or tenderness. \par Both knees have a range of motion of\par Extension:                    Right 0 degrees             Left -5 degrees\par Flexion:                                   Right 125 degrees          Left 110 degrees\par Left Knee: There is medial joint line tenderness. There is mild effusion. \par Luis Alfredo's test is positive. Jelly test is positive.\par Lachman's test, Anterior/Posterior Drawer test and Pivot Shift Tests are negative. \par There is left knee grade 1 MCL mediolateral laxity and no anteroposterior instability. \par Patella compression test is negative and patellofemoral tracking is normal with no lateral subluxation, apprehension or instability. \par Right knee quadriceps and hamstrings power is 5.\par Left knee quadriceps and hamstrings power is 4+.\par \par Hip Exam:\par The gait and station is normal\par The patient has equal leg lengths and no pelvic tilt. Chavez/Sunni test is 7 inches on the right and 7 inches on the left. Active SLR is 60 degrees on the right and 60 degrees on the left. Both hips demonstrate no scars and the skin has no signs of inflammation or tenderness. \par Both Hips have a normal range of motion of flexion to 100 degrees, abduction 40 degrees, adduction 20 degrees, external rotation 40 degrees, internal rotation 20 degrees with symmetrical motion in flexion and extension. There is no flexion contracture, deformity or instability. Labral impingement tests are negative.\par Both hips flexor, abductor and extensor power is normal.\par \par Spine Exam:\par There is mild curvature of the spine with loss of normal lumbar lordosis. The skin is devoid of erythema, scars, pigmentation or rashes. There is mild lumbar spasm and tenderness especially at L4-L5 or L5-S1. \par The range of motion of the lumbar spine is limited by pain and spasm. Forward flexion is 80% normal, extension catch positive, lateral flexion and rotation 80% normal. There is no lumbar spine imbalance or instability detected.\par Bilateral passive SLR is right 40 degrees, left 40 degrees in supine and sitting positions. Lasegue's Test is negative.\par Neurology:\par The patient is alert and oriented in person, place and time. The mood is calm and affect is normal.\par Testing for coordination including Rhomberg's Test and Finger-Nose Test, sensation, motor tone and power and deep tendon reflexes in both lower extremities is normal. [de-identified] : The following radiographs were ordered and read by me during this patients visit. I reviewed each radiograph in detail with the patient and discussed the findings as highlighted below. \par AP, lateral and skyline views of the bilateral knees confirm left knee advanced degenerative joint disease with medial joint space narrowing and osteophyte formation.Right knee reveals a well fixed and aligned right total knee replacement with no evidence of mechanical failure or periprosthetic fracture. \par AP view of the pelvis are within normal limits \par AP and lateral views of the lumbar spine reveal DJD with loss of normal lordosis

## 2019-07-31 NOTE — HISTORY OF PRESENT ILLNESS
[Healed] : healed [Clean/Dry/Intact] : clean, dry and intact [Neuro Intact] : an unremarkable neurological exam [Vascular Intact] : ~T peripheral vascular exam normal [Negative Julio's] : maneuvers demonstrated a negative Julio's sign [Doing Well] : is doing well [No Sign of Infection] : is showing no signs of infection [Steri-Strips Removed & Replaced] : steri-strips removed and replaced [Adequate Pain Control] : has adequate pain control [Chills] : no chills [Fever] : no fever [Erythema] : not erythematous [Discharge] : absent of discharge [Swelling] : not swollen [Dehiscence] : not dehisced [de-identified] : s/p left total knee replacement 7/17/2019 [de-identified] : Ms. JACE RUIZ  presents s/p left total knee replacement. She is participating in home physical therapy as well as a home exercise program daily. She is taking tramadol, gabapentin, and oxycodone for pain and pain level is controlled. she is taking aspirin for DVT ppx. Patient went to her PCP for urinary symptoms, and was put on Ciprofloxacin. \par  [de-identified] : LEFT Knee: Walks with a left stiff knee gait. The dressing was removed, incision cleaned, and steri strips applied to the incision. There is a well-healed scar surgery with no significant swelling, redness or tenderness. There is a valgus alignment of 5°, no effusion, active straight leg raise of 60° and knee range of motion of 0-95° with good stability alignment and quadriceps grade 4 power. There is some ecchymosis to the distal lower extremity. \par  [de-identified] : AP, lateral and sunrise views of the left knee taken today reveal a well fixed and aligned left total knee replacement with no evidence of mechanical failure or periprosthetic fracture.\par \par  [de-identified] : The patient was informed of the findings and recommended conservative management in the form of a home exercise program, activity modifications, stationary bicycling, and ambulation with a cane.  A prescription for a course of physical therapy was provided.  Prescriptions for tramadol, oxycodone, and gabapentin have been provided for pain control.  A long discussion was had with the patient advising them to wean from use of the narcotic medication as tolerated. She will take aspirin for DVT prophylaxis for another four weeks.  The risks, benefits, and side effects were discussed.  Follow up appointment was recommended in six weeks with [facility]. \par

## 2019-07-31 NOTE — CONSULT LETTER
[Dear  ___] : Dear  [unfilled], [Please see my note below.] : Please see my note below. [I had the pleasure of evaluating your patient, [unfilled].] : I had the pleasure of evaluating your patient, [unfilled]. [Sincerely,] : Sincerely, [FreeTextEntry2] : BENNETT LEWIS  [FreeTextEntry3] : Gucci Calderon MD\par \par ________________________________________________\par [facility] : Hip/Knee Arthroplasty\par [address], [place] [address]\par (T) [phone]\par (F) [phone]

## 2019-07-31 NOTE — DISCUSSION/SUMMARY
[de-identified] : Stable Right TKR, Left Knee advanced degenerative joint disease status post arthroscopy, Lumbar DJD\par The natural history and treatment of degenerative arthritis was discussed with the patient at length today. The spectrum of treatment including nonoperative modalities to surgical intervention was elucidated. Noninvasive and nonoperative treatment modalities include weight reduction, activity modification with low impact exercise,  as needed use of acetaminophen or anti-inflammatory medications if tolerated, glucosamine/chondroitin supplements, and physical therapy. Further treatments can include corticosteroid injection and the use of viscosupplementation with hyaluronic acid injections. Definitive surgical treatment can certainly include total joint arthroplasty also. The risks and benefits of each treatment options was discussed and all questions were answered.\par I have provided the patient with a referral to Dr. Aldana for evaluation of the lumbar spine.\par In view of lack of adequate pain relief with conservative (non-surgical) management protocol including physical therapy, home exercises, weight loss, activity modification, NSAIDS; the patient is recommended to consider a Left Total Knee Replacement. \par The risks, benefits, alternatives, implications, complications including but not limited to pain, stiffness, bleeding, limp, wound breakdown, infection, bone fracture, nerve and vascular compromise, implant wear, fixation options depending on bone quality, instability, and durability issues and rehabilitation were discussed and relevant questions were addressed. The possibility of recurrent pain, no improvement in pain and actual worsening of the pain were also mentioned in conversation with the patient. Medical complications related to the patient's general medical health including deep vein thrombosis, pulmonary embolus, heart attack, stroke, death and other complications from anesthesia were discussed as well. The patient wishes to proceed and will undergo preoperative medical evaluation and clearance, attend the preoperative educational class and will schedule surgery appropriately.\par Anticoagulation prophylaxis medication options to address risks of deep vein thrombosis and pulmonary embolism were discussed and weighed against the risks of bleeding and wound healing complications. The patient elected aspirin/coumadin prophylaxis with mechanical modalities.

## 2019-07-31 NOTE — CONSULT LETTER
[Dear  ___] : Dear  [unfilled], [Consult Letter:] : I had the pleasure of evaluating your patient, [unfilled]. [Please see my note below.] : Please see my note below. [Consult Closing:] : Thank you very much for allowing me to participate in the care of this patient.  If you have any questions, please do not hesitate to contact me. [Sincerely,] : Sincerely, [FreeTextEntry2] : BENNETT LEWIS\par  [FreeTextEntry3] : Gucci Calderon MD\par \par ______________________________________________\par [facility]: Hip/Knee Arthroplasty\par [address], [address], [place] [address]\par (t) [phone]\par (f) [phone]

## 2019-07-31 NOTE — HISTORY OF PRESENT ILLNESS
[8] : an average pain level of 8/10 [de-identified] : Ms. JACE RUIZ is a 58 year old female, Status post right TKR 8/31/2017 uncomplicated, status post left knee arthroscopy in 2015 following a fall on the left knee, presenting with left knee pain, now worsening. She localizes the pain to the medial and anterior aspect of the Left knee. The patient describes the pain as sharp, and states it is intermittent, based on activity. She states the pain is exacerbated by walking long distance, climbing/descending stairs, and rising from a seated position. She has been taking NSAIDs for pain with only mild temporary relief. She admits to prior conservative management inclusive of physical therapy with only mild improvement in condition. No injections to the left knee. She denies hip pain but admits to lower back pain. The patient admits to limitations in there quality of life, and is present to discuss options for treatment. JTM\par She denies any pertinent familial, social, or past medical history. NKDA.

## 2019-08-02 ENCOUNTER — APPOINTMENT (OUTPATIENT)
Dept: ORTHOPEDIC SURGERY | Facility: CLINIC | Age: 59
End: 2019-08-02
Payer: COMMERCIAL

## 2019-08-02 VITALS
SYSTOLIC BLOOD PRESSURE: 100 MMHG | WEIGHT: 200 LBS | HEIGHT: 64 IN | DIASTOLIC BLOOD PRESSURE: 69 MMHG | HEART RATE: 73 BPM | BODY MASS INDEX: 34.15 KG/M2

## 2019-08-02 PROCEDURE — 72100 X-RAY EXAM L-S SPINE 2/3 VWS: CPT

## 2019-08-02 PROCEDURE — 99214 OFFICE O/P EST MOD 30 MIN: CPT

## 2019-08-05 ENCOUNTER — OTHER (OUTPATIENT)
Age: 59
End: 2019-08-05

## 2019-08-16 ENCOUNTER — APPOINTMENT (OUTPATIENT)
Dept: ORTHOPEDIC SURGERY | Facility: CLINIC | Age: 59
End: 2019-08-16
Payer: COMMERCIAL

## 2019-08-16 VITALS
HEART RATE: 75 BPM | DIASTOLIC BLOOD PRESSURE: 70 MMHG | HEIGHT: 64 IN | BODY MASS INDEX: 34.15 KG/M2 | SYSTOLIC BLOOD PRESSURE: 104 MMHG | WEIGHT: 200 LBS

## 2019-08-16 DIAGNOSIS — M47.816 SPONDYLOSIS W/OUT MYELOPATHY OR RADICULOPATHY, LUMBAR REGION: ICD-10-CM

## 2019-08-16 PROCEDURE — 99214 OFFICE O/P EST MOD 30 MIN: CPT

## 2019-08-21 RX ORDER — TRAMADOL HYDROCHLORIDE 50 MG/1
50 TABLET, COATED ORAL
Qty: 21 | Refills: 1 | Status: ACTIVE | COMMUNITY
Start: 2019-08-21 | End: 1900-01-01

## 2019-08-22 ENCOUNTER — RX RENEWAL (OUTPATIENT)
Age: 59
End: 2019-08-22

## 2019-10-10 ENCOUNTER — APPOINTMENT (OUTPATIENT)
Dept: ORTHOPEDIC SURGERY | Facility: CLINIC | Age: 59
End: 2019-10-10
Payer: COMMERCIAL

## 2019-10-10 VITALS
SYSTOLIC BLOOD PRESSURE: 95 MMHG | DIASTOLIC BLOOD PRESSURE: 63 MMHG | BODY MASS INDEX: 33.97 KG/M2 | HEART RATE: 80 BPM | HEIGHT: 64 IN | WEIGHT: 199 LBS

## 2019-10-10 PROCEDURE — 99024 POSTOP FOLLOW-UP VISIT: CPT | Mod: 24

## 2019-10-10 PROCEDURE — 99213 OFFICE O/P EST LOW 20 MIN: CPT | Mod: 24

## 2019-10-10 RX ORDER — GABAPENTIN 100 MG/1
100 CAPSULE ORAL 3 TIMES DAILY
Qty: 90 | Refills: 0 | Status: ACTIVE | COMMUNITY
Start: 2019-10-10 | End: 1900-01-01

## 2019-11-01 NOTE — DISCUSSION/SUMMARY
[de-identified] : s/p left total knee replacement. \par The patient was informed of the findings and recommended conservative management in the form of a home exercise program, activity modifications, stationary bicycling, and ambulation with a heel toe gait.   She will continue with home exercise, and routine strength and conditioning, to maintain ROM and strength.  The risks, benefits, and side effects were discussed.  Follow up appointment was recommended in three months. \par She will continue on Eliquis as per her PCP for treatment of DVT.

## 2019-11-01 NOTE — HISTORY OF PRESENT ILLNESS
[de-identified] : Ms. JACE RUIZ is a 58 year old female presents s/p left total knee replacement 7/17/ 2019, presents for evaluation. She notes she has been doing physcial therapy and home exercise. She notes while at physical therapy on 9/9/19, she was evaluated as she had excessive swelling LLE along with positive homans test. She was diagnosed with DVT ultimately following ultrasound confirmation. She is now on Eliquis therapy, under the care of her PCP. \par She notes a set back due to this, but is making progress now with physical therapy and exercise. \par  [Stable] : stable [2] : a current pain level of 2/10 [Standing] : standing [Intermit.] : ~He/She~ states the symptoms seem to be intermittent [Walking] : worsened by walking [Knee Flexion] : worsened with knee flexion [Knee Extension] : worsened with knee extension [Rest] : relieved by rest

## 2019-11-01 NOTE — PHYSICAL EXAM
[de-identified] : On general examination the patient is adequately groomed and nourished. The vital parameters are as recorded. \par There is no lymphedema or diffuse swelling, no varicose veins, no skin warmth/erythema/scars/swelling, no ulcers and no palpable lymph nodes or masses in both lower extremities. Bilateral pedal pulses are well palpable.\par Upper Extremity:\par Both right and left upper extremities are unremarkable in terms of skin rash, lesions, pigmentation, redness, tenderness, swelling, joint instability, abnormal deformity or crepitus. The overall range of motion, sensation, motor tone and strength testing are normal.\par \par LEFT Knee: Walks with a left stiff knee gait. There is a well-healed scar surgery with no significant swelling, redness or tenderness. There is a valgus alignment of 5°, no effusion, active straight leg raise of 60° and knee range of motion of 0-115° with good stability alignment and quadriceps grade 4 power.\par  [de-identified] : AP, lateral, and skyline views of the left knee taken last visit reveal a well fixed and aligned left total knee replacement with no evidence of mechanical failure or periprosthetic fracture.\par

## 2019-11-01 NOTE — CONSULT LETTER
[Dear  ___] : Dear  [unfilled], [Consult Letter:] : I had the pleasure of evaluating your patient, [unfilled]. [Please see my note below.] : Please see my note below. [Consult Closing:] : Thank you very much for allowing me to participate in the care of this patient.  If you have any questions, please do not hesitate to contact me. [Sincerely,] : Sincerely, [FreeTextEntry2] : BENNETT LEWIS\par  [FreeTextEntry3] : Gucci Calderon MD\par \par ______________________________________________\par [facility]: Hip/Knee Arthroplasty\par [address], [address], [place] [address]\par (t) [phone]\par (f) [phone]\par

## 2019-11-18 ENCOUNTER — FORM ENCOUNTER (OUTPATIENT)
Age: 59
End: 2019-11-18

## 2020-03-19 ENCOUNTER — APPOINTMENT (OUTPATIENT)
Dept: ORTHOPEDIC SURGERY | Facility: CLINIC | Age: 60
End: 2020-03-19

## 2020-06-28 ENCOUNTER — EMERGENCY (EMERGENCY)
Facility: HOSPITAL | Age: 60
LOS: 1 days | Discharge: HOME | End: 2020-06-28
Attending: STUDENT IN AN ORGANIZED HEALTH CARE EDUCATION/TRAINING PROGRAM
Payer: COMMERCIAL

## 2020-06-28 VITALS
HEART RATE: 57 BPM | TEMPERATURE: 98 F | SYSTOLIC BLOOD PRESSURE: 109 MMHG | OXYGEN SATURATION: 97 % | WEIGHT: 177.03 LBS | RESPIRATION RATE: 18 BRPM | DIASTOLIC BLOOD PRESSURE: 70 MMHG | HEIGHT: 65 IN

## 2020-06-28 VITALS
OXYGEN SATURATION: 98 % | DIASTOLIC BLOOD PRESSURE: 72 MMHG | HEART RATE: 62 BPM | SYSTOLIC BLOOD PRESSURE: 110 MMHG | RESPIRATION RATE: 17 BRPM | TEMPERATURE: 98 F

## 2020-06-28 DIAGNOSIS — Z96.651 PRESENCE OF RIGHT ARTIFICIAL KNEE JOINT: Chronic | ICD-10-CM

## 2020-06-28 DIAGNOSIS — Z90.710 ACQUIRED ABSENCE OF BOTH CERVIX AND UTERUS: Chronic | ICD-10-CM

## 2020-06-28 DIAGNOSIS — Z98.890 OTHER SPECIFIED POSTPROCEDURAL STATES: Chronic | ICD-10-CM

## 2020-06-28 PROCEDURE — 99283 EMERGENCY DEPT VISIT LOW MDM: CPT | Mod: 25

## 2020-06-28 PROCEDURE — 90715 TDAP VACCINE 7 YRS/> IM: CPT

## 2020-06-28 PROCEDURE — 12002 RPR S/N/AX/GEN/TRNK2.6-7.5CM: CPT

## 2020-06-28 PROCEDURE — 90471 IMMUNIZATION ADMIN: CPT

## 2020-06-28 RX ORDER — TETANUS TOXOID, REDUCED DIPHTHERIA TOXOID AND ACELLULAR PERTUSSIS VACCINE, ADSORBED 5; 2.5; 8; 8; 2.5 [IU]/.5ML; [IU]/.5ML; UG/.5ML; UG/.5ML; UG/.5ML
0.5 SUSPENSION INTRAMUSCULAR ONCE
Refills: 0 | Status: COMPLETED | OUTPATIENT
Start: 2020-06-28 | End: 2020-06-28

## 2020-06-28 RX ADMIN — TETANUS TOXOID, REDUCED DIPHTHERIA TOXOID AND ACELLULAR PERTUSSIS VACCINE, ADSORBED 0.5 MILLILITER(S): 5; 2.5; 8; 8; 2.5 SUSPENSION INTRAMUSCULAR at 17:54

## 2020-06-28 NOTE — ED ADULT NURSE NOTE - OBJECTIVE STATEMENT
Patient present with laceration to right lower leg. Patient was getting out of car accidently hitting garbage bag unknown there was sharp object in bag causing laceration. Patient is a nurse clean wound and wrapped it. Hx DVT on Eliquis Patient present with laceration to right lower leg. Patient was getting out of car accidently hitting garbage bag unknown there was sharp object in bag causing laceration. Patient is a nurse clean wound and wrapped it. Hx DVT on Eliquis. No acute distress noted, denies chest pain, no shortness of breath indicated. Safety maintained.

## 2020-06-28 NOTE — ED PROVIDER NOTE - PRINCIPAL DIAGNOSIS
Laceration of leg excluding thigh, left, initial encounter Laceration of leg excluding thigh, right, initial encounter

## 2020-06-28 NOTE — ED PROVIDER NOTE - CARE PLAN
Principal Discharge DX:	Laceration of leg excluding thigh, left, initial encounter Principal Discharge DX:	Laceration of leg excluding thigh, right, initial encounter

## 2020-06-28 NOTE — ED ADULT TRIAGE NOTE - CHIEF COMPLAINT QUOTE
Patient present with laceration to right lower leg. Patient was getting out of car accidently hitting garbage bag unknown there was sharp object in bag causing laceration. Patient is a nurse clean wound and wrapped it. Hx DVT on Eliquis

## 2020-06-28 NOTE — ED PROVIDER NOTE - PATIENT PORTAL LINK FT
You can access the FollowMyHealth Patient Portal offered by [facility] by registering at the following website: [email or web address]. By joining [email or web address]’s FollowMyHealth portal, you will also be able to view your health information using other applications (apps) compatible with our system.

## 2020-07-22 NOTE — ASU PATIENT PROFILE, ADULT - VISION (WITH CORRECTIVE LENSES IF THE PATIENT USUALLY WEARS THEM):
Normal vision: sees adequately in most situations; can see medication labels, newsprint
-Patient cleared for discharge home  -PTL precautions reviewed  -Kick counts reviewed  -Patient instructed to follow up with next scheduled appointment  -Verbal and written discharge instructions given

## 2020-09-29 ENCOUNTER — APPOINTMENT (OUTPATIENT)
Dept: ORTHOPEDIC SURGERY | Facility: CLINIC | Age: 60
End: 2020-09-29
Payer: COMMERCIAL

## 2020-09-29 VITALS
OXYGEN SATURATION: 96 % | DIASTOLIC BLOOD PRESSURE: 70 MMHG | HEART RATE: 66 BPM | WEIGHT: 186 LBS | HEIGHT: 64 IN | BODY MASS INDEX: 31.76 KG/M2 | SYSTOLIC BLOOD PRESSURE: 101 MMHG

## 2020-09-29 DIAGNOSIS — Z96.652 PRESENCE OF LEFT ARTIFICIAL KNEE JOINT: ICD-10-CM

## 2020-09-29 PROCEDURE — 73562 X-RAY EXAM OF KNEE 3: CPT | Mod: LT

## 2020-09-29 PROCEDURE — 99213 OFFICE O/P EST LOW 20 MIN: CPT

## 2020-09-29 RX ORDER — MELOXICAM 15 MG/1
15 TABLET ORAL DAILY
Qty: 30 | Refills: 1 | Status: ACTIVE | COMMUNITY
Start: 2020-09-29 | End: 1900-01-01

## 2020-09-29 NOTE — DISCUSSION/SUMMARY
[de-identified] : Left total knee replacement, with continued pain\par The patient was informed of the findings and reassured the implants remain stable, with no sign of mechanical failure on examination as well as radiographs. She is exhibiting signs of quad weakness, which may be a cause of her pain. She has stiffness of the knee as well, consistent with mild arthrofibrosis, which is also a possible cause of pain. At this time I have recommended strengthening of the quad with physical therapy. she has been recommended for routine home exercise to maintain strength and mobility. A prescription for non-steroidal anti-inflammatory medication - meloxicam  was provided and the risks, benefits and side effects were discussed. She will follow up in three months or sooner if needed.

## 2020-09-29 NOTE — HISTORY OF PRESENT ILLNESS
[4] : a current pain level of 4/10 [de-identified] : Ms. JACE RUIZ is a 59 year old female presents s/p left total knee replacement 7/2019, with persistent pain to the left knee. She notes she has not had any injury or fall. She notes she has had 10 sessions of PT immediately following surgery, denies any recent treatment. She notes she has had pain along the anterior knee, worsened by walking and bending the knee. She notes the knee feels stiff as well. \par She presents to make sure her knee replacement is stable and to evaluate her continued pain.

## 2020-09-29 NOTE — PHYSICAL EXAM
[de-identified] : On general examination the patient is adequately groomed and nourished. The vital parameters are as recorded. \par There is no lymphedema or diffuse swelling, no varicose veins, no skin warmth/erythema/scars/swelling, no ulcers and no palpable lymph nodes or masses in both lower extremities. Bilateral pedal pulses are well palpable.\par Upper Extremity:\par Both right and left upper extremities are unremarkable in terms of skin rash, lesions, pigmentation, redness, tenderness, swelling, joint instability, abnormal deformity or crepitus. The overall range of motion, sensation, motor tone and strength testing are normal.\par \par LEFT Knee: Walks with a normal gait.  There is a well-healed scar surgery with no significant swelling, redness or tenderness. There is a valgus alignment of 5°, no effusion, active straight leg raise of 60° and knee range of motion of 0-95° with good stability alignment and quadriceps grade 4 power.\par  [de-identified] : The following radiographs were ordered and read by me during this patients visit. I reviewed each radiograph in detail with the patient and discussed the findings as highlighted below.\par AP, lateral, and skyline views of the left knee taken today reveal a well fixed and aligned left total knee replacement with no evidence of mechanical failure or periprosthetic fracture.\par

## 2020-10-01 ENCOUNTER — APPOINTMENT (OUTPATIENT)
Dept: ORTHOPEDIC SURGERY | Facility: CLINIC | Age: 60
End: 2020-10-01

## 2020-11-17 ENCOUNTER — APPOINTMENT (OUTPATIENT)
Dept: PLASTIC SURGERY | Facility: CLINIC | Age: 60
End: 2020-11-17
Payer: COMMERCIAL

## 2020-11-17 VITALS — BODY MASS INDEX: 32.95 KG/M2 | HEIGHT: 64 IN | WEIGHT: 193 LBS

## 2020-11-17 DIAGNOSIS — N64.81 PTOSIS OF BREAST: ICD-10-CM

## 2020-11-17 PROCEDURE — 99203 OFFICE O/P NEW LOW 30 MIN: CPT

## 2020-11-17 PROCEDURE — 99072 ADDL SUPL MATRL&STAF TM PHE: CPT

## 2020-11-17 NOTE — HISTORY OF PRESENT ILLNESS
[FreeTextEntry1] : 60 year sold Patient presents to the office for a breast reduction consult.\par Pt states she is been having neck , shoulder and back pain since the past few years, taking Tylenol as needed and reports she never tried PT.\par Pt is c/o indent on shoulder and denies any rash under breasts.\par Pt has 2 children and has a family history of breast cancer (maternal grandmother), last mammo was around October 2020 (result - normal).\par CBS is 40 DD.\par Pt weights 193 pounds (in office) and she is 5'4 (BMI 33.1 - BSA 1.93).\marilu Pt is here to discuss w/MD.

## 2021-07-20 ENCOUNTER — TRANSCRIPTION ENCOUNTER (OUTPATIENT)
Age: 61
End: 2021-07-20

## 2022-05-26 NOTE — OCCUPATIONAL THERAPY INITIAL EVALUATION ADULT - LEVEL OF INDEPENDENCE: CHAIR TO BED, REHAB EVAL
supervision Terbinafine Pregnancy And Lactation Text: This medication is Pregnancy Category B and is considered safe during pregnancy. It is also excreted in breast milk and breast feeding isn't recommended.

## 2023-12-28 NOTE — H&P PST ADULT - FUNCTIONAL LEVEL PRIOR: BATHING
Alert-The patient is alert, awake and responds to voice. The patient is oriented to time, place, and person. The triage nurse is able to obtain subjective information. 0 = independent